# Patient Record
(demographics unavailable — no encounter records)

---

## 2024-10-24 NOTE — CONSULT LETTER
[Dear  ___] : Dear  [unfilled], [Consult Letter:] : I had the pleasure of evaluating your patient, [unfilled]. [Please see my note below.] : Please see my note below. [Consult Closing:] : Thank you very much for allowing me to participate in the care of this patient.  If you have any questions, please do not hesitate to contact me. [Sincerely,] : Sincerely, [FreeTextEntry3] : Nati Pickett MD Attending Physician, Pediatric Gastroenterology [facility]

## 2024-10-24 NOTE — HISTORY OF PRESENT ILLNESS
[de-identified] : 4yo M here for evaluation of abdominal pain   Symptoms for one year  Abdominal pain and wakes up at night and early morning with symptoms  Very gassy at these time  Stools every 2-3 days, [place] 2-3, sometimes 7. Often very hard. No blood.  Difficulty with toilet training due to autism  Increased distension  No fruits or vegetables in diet at all   Otherwise well between episodes No fever, joint pains, rashes, eye pain

## 2024-10-24 NOTE — ASSESSMENT
[Educated Patient & Family about Diagnosis] : educated the patient and family about the diagnosis [FreeTextEntry1] : 4yo M with autism presents for evaluation of abdominal pain. Increased pain and gassiness at night and in the morning. No fruit or vegetable intake diet and has infrequent hard stools. Discussed most likely cause of symptoms is constipation and low fiber diet. If persistent abdominal pain follow constipation management, plan to expand evaluation. Plan to initiate Miralax 1 cap daily and titrate to effect. Discussed risks, benefits, administration and titration.

## 2024-10-24 NOTE — HISTORY OF PRESENT ILLNESS
[de-identified] : 4yo M here for evaluation of abdominal pain   Symptoms for one year  Abdominal pain and wakes up at night and early morning with symptoms  Very gassy at these time  Stools every 2-3 days, [place] 2-3, sometimes 7. Often very hard. No blood.  Difficulty with toilet training due to autism  Increased distension  No fruits or vegetables in diet at all   Otherwise well between episodes No fever, joint pains, rashes, eye pain

## 2024-10-24 NOTE — PHYSICAL EXAM
[Well Developed] : well developed [Well Nourished] : well nourished [NAD] : in no acute distress [Alert and Active] : alert and active [PERRL] : pupils were equal, round, reactive to light  [icteric] : anicteric [Moist & Pink Mucous Membranes] : moist and pink mucous membranes [Normal Oropharynx] : the oropharynx was normal [Oral Ulcers] : no oral ulcers [CTAB] : lungs clear to auscultation bilaterally [Respiratory Distress] : no respiratory distress  [Regular Rate and Rhythm] : regular rate and rhythm [Normal S1, S2] : normal S1 and S2 [Soft] : soft  [Distended] : non distended [Tender] : non tender [Normal Bowel Sounds] : normal bowel sounds [No HSM] : no hepatosplenomegaly appreciated [Normal Tone] : normal tone [Well-Perfused] : well-perfused [Edema] : no edema [Cyanosis] : no cyanosis [Rash] : no rash [Jaundice] : no jaundice [Interactive] : interactive

## 2024-10-24 NOTE — REASON FOR VISIT
[Consultation] : a consultation visit [FreeTextEntry2] : abdominal pain [Mother] : mother [Interpreters_IDNumber] : [id number] [TWNoteComboBox1] : [place]

## 2024-10-24 NOTE — ASSESSMENT
[Educated Patient & Family about Diagnosis] : educated the patient and family about the diagnosis [FreeTextEntry1] : 2yo M with autism presents for evaluation of abdominal pain. Increased pain and gassiness at night and in the morning. No fruit or vegetable intake diet and has infrequent hard stools. Discussed most likely cause of symptoms is constipation and low fiber diet. If persistent abdominal pain follow constipation management, plan to expand evaluation. Plan to initiate Miralax 1 cap daily and titrate to effect. Discussed risks, benefits, administration and titration.

## 2024-10-24 NOTE — REASON FOR VISIT
[Consultation] : a consultation visit [FreeTextEntry2] : abdominal pain [Mother] : mother [Interpreters_IDNumber] : [id number] [TWNoteComboBox1] : [place]

## 2024-10-24 NOTE — CONSULT LETTER
[Dear  ___] : Dear  [unfilled], [Consult Letter:] : I had the pleasure of evaluating your patient, [unfilled]. [Please see my note below.] : Please see my note below. [Consult Closing:] : Thank you very much for allowing me to participate in the care of this patient.  If you have any questions, please do not hesitate to contact me. [Sincerely,] : Sincerely, [FreeTextEntry3] : Nati Pickett MD Attending Physician, Pediatric Gastroenterology [facility]